# Patient Record
Sex: FEMALE | Race: WHITE | NOT HISPANIC OR LATINO | URBAN - METROPOLITAN AREA
[De-identification: names, ages, dates, MRNs, and addresses within clinical notes are randomized per-mention and may not be internally consistent; named-entity substitution may affect disease eponyms.]

---

## 2018-05-17 ENCOUNTER — IMPORTED ENCOUNTER (OUTPATIENT)
Dept: URBAN - METROPOLITAN AREA CLINIC 31 | Facility: CLINIC | Age: 43
End: 2018-05-17

## 2018-05-17 PROBLEM — H04.123: Noted: 2018-05-17

## 2018-05-17 PROCEDURE — 92014 COMPRE OPH EXAM EST PT 1/>: CPT

## 2018-05-17 NOTE — PATIENT DISCUSSION
1.  Dry Eye OU:  Try Systane Balance at least BID. 2. Refractive error - single vision at this time but discussed MF need will be soon. 3. Return for an appointment in 1 year for comprehensive exam. with Dr. Rosy Martinez.

## 2019-09-25 ENCOUNTER — IMPORTED ENCOUNTER (OUTPATIENT)
Dept: URBAN - METROPOLITAN AREA CLINIC 31 | Facility: CLINIC | Age: 44
End: 2019-09-25

## 2019-09-25 PROBLEM — H04.123: Noted: 2019-09-25

## 2019-09-25 PROCEDURE — 92014 COMPRE OPH EXAM EST PT 1/>: CPT

## 2019-09-25 PROCEDURE — 92015 DETERMINE REFRACTIVE STATE: CPT

## 2019-09-25 NOTE — PATIENT DISCUSSION
Refractive error- RX to pt today. Discussed Progressive lens. Possibly current headaches related to RX off less RX for distance found for OS today.

## 2020-12-28 ENCOUNTER — IMPORTED ENCOUNTER (OUTPATIENT)
Dept: URBAN - METROPOLITAN AREA CLINIC 31 | Facility: CLINIC | Age: 45
End: 2020-12-28

## 2020-12-28 PROBLEM — H00.15: Noted: 2020-12-28

## 2020-12-28 PROCEDURE — 99213 OFFICE O/P EST LOW 20 MIN: CPT

## 2020-12-28 NOTE — PATIENT DISCUSSION
1.  Chalazion left lower eyelid - treatment with warm compresses at least TID. 2. Return for an appointment in 6 months for comprehensive exam with Dr. Nolberto Russell.

## 2022-04-02 ASSESSMENT — VISUAL ACUITY
OS_SC: J1+
OD_CC: 20/20
OS_PH: SC 20/20
OD_CC: 20/20
OD_SC: 20/20
OS_CC: 20/40+1
OS_CC: 20/60
OS_PH: SC 20/25
OS_SC: 20/20
OD_SC: J1+

## 2022-04-02 ASSESSMENT — TONOMETRY
OD_IOP_MMHG: 12
OS_IOP_MMHG: 13
OS_IOP_MMHG: 11
OD_IOP_MMHG: 11